# Patient Record
Sex: FEMALE | Race: WHITE | NOT HISPANIC OR LATINO | URBAN - METROPOLITAN AREA
[De-identification: names, ages, dates, MRNs, and addresses within clinical notes are randomized per-mention and may not be internally consistent; named-entity substitution may affect disease eponyms.]

---

## 2019-04-08 ENCOUNTER — INPATIENT (INPATIENT)
Facility: HOSPITAL | Age: 74
LOS: 17 days | Discharge: HOME | End: 2019-04-26
Attending: PSYCHIATRY & NEUROLOGY | Admitting: PSYCHIATRY & NEUROLOGY
Payer: MEDICARE

## 2019-04-08 VITALS — HEIGHT: 64 IN | TEMPERATURE: 97 F | RESPIRATION RATE: 17 BRPM | WEIGHT: 182.1 LBS

## 2019-04-08 DIAGNOSIS — F32.9 MAJOR DEPRESSIVE DISORDER, SINGLE EPISODE, UNSPECIFIED: ICD-10-CM

## 2019-04-08 RX ORDER — HYDROXYZINE HCL 10 MG
25 TABLET ORAL EVERY 8 HOURS
Qty: 0 | Refills: 0 | Status: DISCONTINUED | OUTPATIENT
Start: 2019-04-08 | End: 2019-04-26

## 2019-04-08 RX ORDER — ATORVASTATIN CALCIUM 80 MG/1
10 TABLET, FILM COATED ORAL
Qty: 0 | Refills: 0 | Status: DISCONTINUED | OUTPATIENT
Start: 2019-04-08 | End: 2019-04-10

## 2019-04-08 RX ORDER — LANOLIN ALCOHOL/MO/W.PET/CERES
3 CREAM (GRAM) TOPICAL AT BEDTIME
Qty: 0 | Refills: 0 | Status: DISCONTINUED | OUTPATIENT
Start: 2019-04-08 | End: 2019-04-26

## 2019-04-08 RX ORDER — CITALOPRAM 10 MG/1
60 TABLET, FILM COATED ORAL
Qty: 0 | Refills: 0 | Status: DISCONTINUED | OUTPATIENT
Start: 2019-04-08 | End: 2019-04-10

## 2019-04-08 RX ORDER — CEPHALEXIN 500 MG
500 CAPSULE ORAL
Qty: 0 | Refills: 0 | Status: DISCONTINUED | OUTPATIENT
Start: 2019-04-08 | End: 2019-04-08

## 2019-04-08 RX ORDER — CEPHALEXIN 500 MG
500 CAPSULE ORAL
Qty: 0 | Refills: 0 | Status: DISCONTINUED | OUTPATIENT
Start: 2019-04-08 | End: 2019-04-09

## 2019-04-08 RX ORDER — LEVOTHYROXINE SODIUM 125 MCG
75 TABLET ORAL
Qty: 0 | Refills: 0 | Status: DISCONTINUED | OUTPATIENT
Start: 2019-04-08 | End: 2019-04-09

## 2019-04-08 RX ORDER — LITHIUM CARBONATE 300 MG/1
300 TABLET, EXTENDED RELEASE ORAL
Qty: 0 | Refills: 0 | Status: DISCONTINUED | OUTPATIENT
Start: 2019-04-08 | End: 2019-04-10

## 2019-04-08 RX ADMIN — Medication 500 MILLIGRAM(S): at 23:48

## 2019-04-08 RX ADMIN — Medication 3 MILLIGRAM(S): at 23:50

## 2019-04-08 NOTE — CHART NOTE - NSCHARTNOTEFT_GEN_A_CORE
Screening Medical Evaluation  Patient Admitted from: [facility] admitting diagnosis: Major depressive disorder with single episode    PAST MEDICAL & SURGICAL HISTORY:        Allergies    No Known Allergies    Intolerances        Social History:     FAMILY HISTORY:      MEDICATIONS  (STANDING):  atorvastatin 10 milliGRAM(s) Oral <User Schedule>  cephalexin 500 milliGRAM(s) Oral <User Schedule>  citalopram 60 milliGRAM(s) Oral <User Schedule>  levothyroxine 75 MICROGram(s) Oral <User Schedule>  lithium 300 milliGRAM(s) Oral <User Schedule>    MEDICATIONS  (PRN):  hydrOXYzine hydrochloride 25 milliGRAM(s) Oral every 8 hours PRN Anxiety  LORazepam     Tablet 0.5 milliGRAM(s) Oral every 6 hours PRN Agitation  LORazepam   Injectable 0.5 milliGRAM(s) IntraMuscular once PRN severe agitation      Vital Signs Last 24 Hrs  T(C): 36.3 (08 Apr 2019 21:45), Max: 36.3 (08 Apr 2019 21:45)  T(F): 97.4 (08 Apr 2019 21:45), Max: 97.4 (08 Apr 2019 21:45)  HR: --87  BP: --141/75  BP(mean): --  RR: 17 (08 Apr 2019 21:45) (17 - 17)  SpO2: --  CAPILLARY BLOOD GLUCOSE            PHYSICAL EXAM:  GENERAL: NAD, well-developed  HEAD:  Atraumatic, Normocephalic  EYES: EOMI, PERRLA, conjunctiva and sclera clear  NECK: Supple, No JVD  CHEST/LUNG: Clear to auscultation bilaterally; No wheeze  HEART: Regular rate and rhythm; No murmurs, rubs, or gallops  ABDOMEN: Soft, Nontender, Nondistended; Bowel sounds present  EXTREMITIES:  2+ Peripheral Pulses, No clubbing, cyanosis, or edema  PSYCH: AAOx3  NEUROLOGY: non-focal  SKIN: Self inflicted wounds noted on B/L wrist requiring sutures    LABS:                    RADIOLOGY & ADDITIONAL TESTS:    Assessment and Plan: 74 yo F with PMH of hypothyroidism and hyperlipidemia along with multiple self inflicted wounds to B/L wrist is admitted to [facility] with a primary psychiatric diagnosis of Major depressive disorder with single episode. The pt currently denies having any medical complaints such as chest pain, sob, abdominal pain, n/v/d/c, or any problems with urination or bowel movements. The rest of her screening physical is unremarkable.    1.Major depressive disorder with single episode-Plan: continue with meds as per primary psychiatric team  2. Hypothyroidism-Plan: continue with levothyroxine  3. Hyperlipidemia-Plan: continue with atorvastatin  4. B/L wrist wound-Plan: continue with cephalexin; continue to monitor for any signs of infection such as fever, chills, redness, or oozing

## 2019-04-09 LAB
BASOPHILS # BLD AUTO: 0.07 K/UL — SIGNIFICANT CHANGE UP (ref 0–0.2)
BASOPHILS NFR BLD AUTO: 0.5 % — SIGNIFICANT CHANGE UP (ref 0–2)
EOSINOPHIL # BLD AUTO: 0.24 K/UL — SIGNIFICANT CHANGE UP (ref 0–0.5)
EOSINOPHIL NFR BLD AUTO: 1.6 % — SIGNIFICANT CHANGE UP (ref 0–6)
HCT VFR BLD CALC: 38.5 % — SIGNIFICANT CHANGE UP (ref 34.5–45)
HGB BLD-MCNC: 12 G/DL — SIGNIFICANT CHANGE UP (ref 11.5–15.5)
IMM GRANULOCYTES NFR BLD AUTO: 0.5 % — SIGNIFICANT CHANGE UP (ref 0–1.5)
LYMPHOCYTES # BLD AUTO: 13.6 % — SIGNIFICANT CHANGE UP (ref 13–44)
LYMPHOCYTES # BLD AUTO: 2.09 K/UL — SIGNIFICANT CHANGE UP (ref 1–3.3)
MCHC RBC-ENTMCNC: 29.3 PG — SIGNIFICANT CHANGE UP (ref 27–34)
MCHC RBC-ENTMCNC: 31.2 % — LOW (ref 32–36)
MCV RBC AUTO: 93.9 FL — SIGNIFICANT CHANGE UP (ref 80–100)
MONOCYTES # BLD AUTO: 0.67 K/UL — SIGNIFICANT CHANGE UP (ref 0–0.9)
MONOCYTES NFR BLD AUTO: 4.4 % — SIGNIFICANT CHANGE UP (ref 2–14)
NEUTROPHILS # BLD AUTO: 12.19 K/UL — HIGH (ref 1.8–7.4)
NEUTROPHILS NFR BLD AUTO: 79.4 % — HIGH (ref 43–77)
NRBC # FLD: 0 K/UL — SIGNIFICANT CHANGE UP (ref 0–0)
PLATELET # BLD AUTO: 430 K/UL — HIGH (ref 150–400)
PMV BLD: 9.5 FL — SIGNIFICANT CHANGE UP (ref 7–13)
RBC # BLD: 4.1 M/UL — SIGNIFICANT CHANGE UP (ref 3.8–5.2)
RBC # FLD: 14.6 % — HIGH (ref 10.3–14.5)
WBC # BLD: 15.34 K/UL — HIGH (ref 3.8–10.5)
WBC # FLD AUTO: 15.34 K/UL — HIGH (ref 3.8–10.5)

## 2019-04-09 PROCEDURE — 99222 1ST HOSP IP/OBS MODERATE 55: CPT

## 2019-04-09 RX ORDER — CEPHALEXIN 500 MG
500 CAPSULE ORAL
Qty: 0 | Refills: 0 | Status: DISCONTINUED | OUTPATIENT
Start: 2019-04-10 | End: 2019-04-10

## 2019-04-09 RX ORDER — LEVOTHYROXINE SODIUM 125 MCG
50 TABLET ORAL
Qty: 0 | Refills: 0 | Status: DISCONTINUED | OUTPATIENT
Start: 2019-04-09 | End: 2019-04-10

## 2019-04-09 RX ORDER — BACITRACIN ZINC 500 UNIT/G
1 OINTMENT IN PACKET (EA) TOPICAL
Qty: 0 | Refills: 0 | Status: DISCONTINUED | OUTPATIENT
Start: 2019-04-09 | End: 2019-04-26

## 2019-04-09 RX ORDER — CEPHALEXIN 500 MG
500 CAPSULE ORAL
Qty: 0 | Refills: 0 | Status: COMPLETED | OUTPATIENT
Start: 2019-04-09 | End: 2019-04-09

## 2019-04-09 RX ADMIN — CITALOPRAM 60 MILLIGRAM(S): 10 TABLET, FILM COATED ORAL at 20:45

## 2019-04-09 RX ADMIN — Medication 50 MICROGRAM(S): at 20:45

## 2019-04-09 RX ADMIN — Medication 0.5 MILLIGRAM(S): at 13:31

## 2019-04-09 RX ADMIN — LITHIUM CARBONATE 300 MILLIGRAM(S): 300 TABLET, EXTENDED RELEASE ORAL at 07:46

## 2019-04-09 RX ADMIN — Medication 1 APPLICATION(S): at 20:45

## 2019-04-09 RX ADMIN — Medication 0.5 MILLIGRAM(S): at 02:21

## 2019-04-09 RX ADMIN — Medication 500 MILLIGRAM(S): at 15:02

## 2019-04-09 RX ADMIN — Medication 500 MILLIGRAM(S): at 22:01

## 2019-04-09 RX ADMIN — LITHIUM CARBONATE 300 MILLIGRAM(S): 300 TABLET, EXTENDED RELEASE ORAL at 20:43

## 2019-04-09 RX ADMIN — ATORVASTATIN CALCIUM 10 MILLIGRAM(S): 80 TABLET, FILM COATED ORAL at 20:46

## 2019-04-09 RX ADMIN — Medication 500 MILLIGRAM(S): at 07:46

## 2019-04-09 RX ADMIN — Medication 3 MILLIGRAM(S): at 20:45

## 2019-04-09 RX ADMIN — Medication 1 APPLICATION(S): at 09:17

## 2019-04-09 NOTE — PHARMACOTHERAPY INTERVENTION NOTE - COMMENTS
Patient ordered for cephalexin 500 mg TID x7 days for prophylaxis of wound treatment. Contacted Dr Moore and recommended that dosing for cephalexin for uncomplicated skin infections is 250 mg orally every 6 hours  mg every 12 hours for 7 to 14 days. If suspecting MSSA, recommended dosing is 500 mg ORALLY 4 times daily. MD agreed to change cephalexin dose to BID for remainder of therapy.

## 2019-04-10 PROCEDURE — 99232 SBSQ HOSP IP/OBS MODERATE 35: CPT

## 2019-04-10 RX ORDER — CEPHALEXIN 500 MG
500 CAPSULE ORAL
Qty: 0 | Refills: 0 | Status: COMPLETED | OUTPATIENT
Start: 2019-04-10 | End: 2019-04-15

## 2019-04-10 RX ORDER — LEVOTHYROXINE SODIUM 125 MCG
50 TABLET ORAL ONCE
Qty: 0 | Refills: 0 | Status: COMPLETED | OUTPATIENT
Start: 2019-04-10 | End: 2019-04-10

## 2019-04-10 RX ORDER — LEVOTHYROXINE SODIUM 125 MCG
50 TABLET ORAL
Qty: 0 | Refills: 0 | Status: DISCONTINUED | OUTPATIENT
Start: 2019-04-10 | End: 2019-04-26

## 2019-04-10 RX ORDER — ESCITALOPRAM OXALATE 10 MG/1
10 TABLET, FILM COATED ORAL DAILY
Qty: 0 | Refills: 0 | Status: DISCONTINUED | OUTPATIENT
Start: 2019-04-10 | End: 2019-04-26

## 2019-04-10 RX ORDER — LANOLIN ALCOHOL/MO/W.PET/CERES
3 CREAM (GRAM) TOPICAL ONCE
Qty: 0 | Refills: 0 | Status: COMPLETED | OUTPATIENT
Start: 2019-04-10 | End: 2019-04-10

## 2019-04-10 RX ORDER — LITHIUM CARBONATE 300 MG/1
300 TABLET, EXTENDED RELEASE ORAL
Qty: 0 | Refills: 0 | Status: DISCONTINUED | OUTPATIENT
Start: 2019-04-10 | End: 2019-04-12

## 2019-04-10 RX ORDER — LITHIUM CARBONATE 300 MG/1
300 TABLET, EXTENDED RELEASE ORAL
Qty: 0 | Refills: 0 | Status: DISCONTINUED | OUTPATIENT
Start: 2019-04-10 | End: 2019-04-10

## 2019-04-10 RX ORDER — ATORVASTATIN CALCIUM 80 MG/1
10 TABLET, FILM COATED ORAL
Qty: 0 | Refills: 0 | Status: DISCONTINUED | OUTPATIENT
Start: 2019-04-10 | End: 2019-04-26

## 2019-04-10 RX ADMIN — Medication 25 MILLIGRAM(S): at 02:42

## 2019-04-10 RX ADMIN — Medication 25 MILLIGRAM(S): at 13:15

## 2019-04-10 RX ADMIN — Medication 50 MICROGRAM(S): at 13:16

## 2019-04-10 RX ADMIN — Medication 0.5 MILLIGRAM(S): at 21:32

## 2019-04-10 RX ADMIN — Medication 1 APPLICATION(S): at 09:59

## 2019-04-10 RX ADMIN — Medication 3 MILLIGRAM(S): at 02:42

## 2019-04-10 RX ADMIN — ATORVASTATIN CALCIUM 10 MILLIGRAM(S): 80 TABLET, FILM COATED ORAL at 20:49

## 2019-04-10 RX ADMIN — ESCITALOPRAM OXALATE 10 MILLIGRAM(S): 10 TABLET, FILM COATED ORAL at 13:16

## 2019-04-10 RX ADMIN — Medication 500 MILLIGRAM(S): at 20:49

## 2019-04-10 RX ADMIN — Medication 500 MILLIGRAM(S): at 10:05

## 2019-04-10 RX ADMIN — LITHIUM CARBONATE 300 MILLIGRAM(S): 300 TABLET, EXTENDED RELEASE ORAL at 20:49

## 2019-04-10 RX ADMIN — Medication 1 APPLICATION(S): at 20:50

## 2019-04-11 PROCEDURE — 99232 SBSQ HOSP IP/OBS MODERATE 35: CPT

## 2019-04-11 RX ORDER — CLONAZEPAM 1 MG
0.5 TABLET ORAL
Qty: 0 | Refills: 0 | Status: DISCONTINUED | OUTPATIENT
Start: 2019-04-11 | End: 2019-04-18

## 2019-04-11 RX ADMIN — Medication 3 MILLIGRAM(S): at 00:47

## 2019-04-11 RX ADMIN — ATORVASTATIN CALCIUM 10 MILLIGRAM(S): 80 TABLET, FILM COATED ORAL at 19:58

## 2019-04-11 RX ADMIN — Medication 25 MILLIGRAM(S): at 14:49

## 2019-04-11 RX ADMIN — ESCITALOPRAM OXALATE 10 MILLIGRAM(S): 10 TABLET, FILM COATED ORAL at 08:34

## 2019-04-11 RX ADMIN — LITHIUM CARBONATE 300 MILLIGRAM(S): 300 TABLET, EXTENDED RELEASE ORAL at 08:34

## 2019-04-11 RX ADMIN — Medication 3 MILLIGRAM(S): at 22:46

## 2019-04-11 RX ADMIN — Medication 500 MILLIGRAM(S): at 08:34

## 2019-04-11 RX ADMIN — Medication 1 APPLICATION(S): at 20:00

## 2019-04-11 RX ADMIN — Medication 5 MILLIGRAM(S): at 04:08

## 2019-04-11 RX ADMIN — Medication 0.5 MILLIGRAM(S): at 10:47

## 2019-04-11 RX ADMIN — Medication 50 MICROGRAM(S): at 06:15

## 2019-04-11 RX ADMIN — Medication 0.5 MILLIGRAM(S): at 20:00

## 2019-04-11 RX ADMIN — Medication 500 MILLIGRAM(S): at 19:58

## 2019-04-11 RX ADMIN — Medication 1 APPLICATION(S): at 08:31

## 2019-04-11 RX ADMIN — LITHIUM CARBONATE 300 MILLIGRAM(S): 300 TABLET, EXTENDED RELEASE ORAL at 19:59

## 2019-04-11 RX ADMIN — Medication 0.5 MILLIGRAM(S): at 23:25

## 2019-04-12 PROCEDURE — 99232 SBSQ HOSP IP/OBS MODERATE 35: CPT

## 2019-04-12 RX ORDER — LITHIUM CARBONATE 300 MG/1
150 TABLET, EXTENDED RELEASE ORAL DAILY
Qty: 0 | Refills: 0 | Status: DISCONTINUED | OUTPATIENT
Start: 2019-04-13 | End: 2019-04-26

## 2019-04-12 RX ORDER — LITHIUM CARBONATE 300 MG/1
300 TABLET, EXTENDED RELEASE ORAL
Qty: 0 | Refills: 0 | Status: DISCONTINUED | OUTPATIENT
Start: 2019-04-12 | End: 2019-04-26

## 2019-04-12 RX ORDER — QUETIAPINE FUMARATE 200 MG/1
25 TABLET, FILM COATED ORAL EVERY 6 HOURS
Qty: 0 | Refills: 0 | Status: DISCONTINUED | OUTPATIENT
Start: 2019-04-12 | End: 2019-04-26

## 2019-04-12 RX ADMIN — Medication 50 MICROGRAM(S): at 06:36

## 2019-04-12 RX ADMIN — ATORVASTATIN CALCIUM 10 MILLIGRAM(S): 80 TABLET, FILM COATED ORAL at 20:22

## 2019-04-12 RX ADMIN — Medication 1 APPLICATION(S): at 09:20

## 2019-04-12 RX ADMIN — ESCITALOPRAM OXALATE 10 MILLIGRAM(S): 10 TABLET, FILM COATED ORAL at 09:20

## 2019-04-12 RX ADMIN — Medication 1 APPLICATION(S): at 20:22

## 2019-04-12 RX ADMIN — Medication 500 MILLIGRAM(S): at 09:20

## 2019-04-12 RX ADMIN — Medication 0.5 MILLIGRAM(S): at 09:20

## 2019-04-12 RX ADMIN — Medication 500 MILLIGRAM(S): at 20:22

## 2019-04-12 RX ADMIN — Medication 0.5 MILLIGRAM(S): at 20:22

## 2019-04-12 RX ADMIN — Medication 3 MILLIGRAM(S): at 20:28

## 2019-04-12 RX ADMIN — LITHIUM CARBONATE 300 MILLIGRAM(S): 300 TABLET, EXTENDED RELEASE ORAL at 09:17

## 2019-04-12 RX ADMIN — LITHIUM CARBONATE 300 MILLIGRAM(S): 300 TABLET, EXTENDED RELEASE ORAL at 20:22

## 2019-04-13 RX ORDER — LANOLIN ALCOHOL/MO/W.PET/CERES
3 CREAM (GRAM) TOPICAL ONCE
Qty: 0 | Refills: 0 | Status: COMPLETED | OUTPATIENT
Start: 2019-04-13 | End: 2019-04-13

## 2019-04-13 RX ORDER — NYSTATIN CREAM 100000 [USP'U]/G
1 CREAM TOPICAL
Qty: 0 | Refills: 0 | Status: DISCONTINUED | OUTPATIENT
Start: 2019-04-13 | End: 2019-04-26

## 2019-04-13 RX ADMIN — Medication 500 MILLIGRAM(S): at 09:33

## 2019-04-13 RX ADMIN — Medication 1 APPLICATION(S): at 21:23

## 2019-04-13 RX ADMIN — Medication 1 APPLICATION(S): at 09:33

## 2019-04-13 RX ADMIN — LITHIUM CARBONATE 150 MILLIGRAM(S): 300 TABLET, EXTENDED RELEASE ORAL at 09:32

## 2019-04-13 RX ADMIN — ATORVASTATIN CALCIUM 10 MILLIGRAM(S): 80 TABLET, FILM COATED ORAL at 21:08

## 2019-04-13 RX ADMIN — Medication 3 MILLIGRAM(S): at 02:24

## 2019-04-13 RX ADMIN — Medication 50 MICROGRAM(S): at 06:11

## 2019-04-13 RX ADMIN — ESCITALOPRAM OXALATE 10 MILLIGRAM(S): 10 TABLET, FILM COATED ORAL at 09:32

## 2019-04-13 RX ADMIN — Medication 0.5 MILLIGRAM(S): at 09:33

## 2019-04-13 RX ADMIN — Medication 25 MILLIGRAM(S): at 11:03

## 2019-04-13 RX ADMIN — Medication 500 MILLIGRAM(S): at 21:09

## 2019-04-13 RX ADMIN — LITHIUM CARBONATE 300 MILLIGRAM(S): 300 TABLET, EXTENDED RELEASE ORAL at 21:09

## 2019-04-13 RX ADMIN — Medication 0.5 MILLIGRAM(S): at 21:09

## 2019-04-14 PROCEDURE — 99232 SBSQ HOSP IP/OBS MODERATE 35: CPT

## 2019-04-14 RX ADMIN — LITHIUM CARBONATE 150 MILLIGRAM(S): 300 TABLET, EXTENDED RELEASE ORAL at 09:03

## 2019-04-14 RX ADMIN — Medication 0.5 MILLIGRAM(S): at 20:36

## 2019-04-14 RX ADMIN — ATORVASTATIN CALCIUM 10 MILLIGRAM(S): 80 TABLET, FILM COATED ORAL at 20:37

## 2019-04-14 RX ADMIN — Medication 500 MILLIGRAM(S): at 09:03

## 2019-04-14 RX ADMIN — Medication 25 MILLIGRAM(S): at 01:17

## 2019-04-14 RX ADMIN — ESCITALOPRAM OXALATE 10 MILLIGRAM(S): 10 TABLET, FILM COATED ORAL at 09:04

## 2019-04-14 RX ADMIN — Medication 3 MILLIGRAM(S): at 00:40

## 2019-04-14 RX ADMIN — NYSTATIN CREAM 1 APPLICATION(S): 100000 CREAM TOPICAL at 09:03

## 2019-04-14 RX ADMIN — Medication 0.5 MILLIGRAM(S): at 09:03

## 2019-04-14 RX ADMIN — Medication 500 MILLIGRAM(S): at 20:36

## 2019-04-14 RX ADMIN — Medication 50 MICROGRAM(S): at 06:21

## 2019-04-14 RX ADMIN — Medication 1 APPLICATION(S): at 09:03

## 2019-04-14 RX ADMIN — Medication 1 APPLICATION(S): at 20:36

## 2019-04-14 RX ADMIN — LITHIUM CARBONATE 300 MILLIGRAM(S): 300 TABLET, EXTENDED RELEASE ORAL at 20:36

## 2019-04-15 LAB — LITHIUM SERPL-MCNC: 0.92 MMOL/L — SIGNIFICANT CHANGE UP (ref 0.6–1.2)

## 2019-04-15 PROCEDURE — 99232 SBSQ HOSP IP/OBS MODERATE 35: CPT

## 2019-04-15 RX ADMIN — NYSTATIN CREAM 1 APPLICATION(S): 100000 CREAM TOPICAL at 10:21

## 2019-04-15 RX ADMIN — Medication 1 APPLICATION(S): at 10:20

## 2019-04-15 RX ADMIN — Medication 50 MICROGRAM(S): at 05:30

## 2019-04-15 RX ADMIN — Medication 3 MILLIGRAM(S): at 01:22

## 2019-04-15 RX ADMIN — ATORVASTATIN CALCIUM 10 MILLIGRAM(S): 80 TABLET, FILM COATED ORAL at 20:19

## 2019-04-15 RX ADMIN — Medication 0.5 MILLIGRAM(S): at 09:24

## 2019-04-15 RX ADMIN — Medication 500 MILLIGRAM(S): at 09:24

## 2019-04-15 RX ADMIN — LITHIUM CARBONATE 150 MILLIGRAM(S): 300 TABLET, EXTENDED RELEASE ORAL at 09:24

## 2019-04-15 RX ADMIN — LITHIUM CARBONATE 300 MILLIGRAM(S): 300 TABLET, EXTENDED RELEASE ORAL at 20:19

## 2019-04-15 RX ADMIN — Medication 0.5 MILLIGRAM(S): at 20:19

## 2019-04-15 RX ADMIN — Medication 1 APPLICATION(S): at 21:03

## 2019-04-15 RX ADMIN — ESCITALOPRAM OXALATE 10 MILLIGRAM(S): 10 TABLET, FILM COATED ORAL at 09:24

## 2019-04-16 PROCEDURE — 99232 SBSQ HOSP IP/OBS MODERATE 35: CPT

## 2019-04-16 RX ORDER — QUETIAPINE FUMARATE 200 MG/1
25 TABLET, FILM COATED ORAL AT BEDTIME
Qty: 0 | Refills: 0 | Status: DISCONTINUED | OUTPATIENT
Start: 2019-04-16 | End: 2019-04-17

## 2019-04-16 RX ADMIN — ESCITALOPRAM OXALATE 10 MILLIGRAM(S): 10 TABLET, FILM COATED ORAL at 08:23

## 2019-04-16 RX ADMIN — Medication 0.5 MILLIGRAM(S): at 20:12

## 2019-04-16 RX ADMIN — Medication 3 MILLIGRAM(S): at 00:00

## 2019-04-16 RX ADMIN — Medication 25 MILLIGRAM(S): at 16:00

## 2019-04-16 RX ADMIN — Medication 25 MILLIGRAM(S): at 02:25

## 2019-04-16 RX ADMIN — Medication 1 APPLICATION(S): at 20:12

## 2019-04-16 RX ADMIN — LITHIUM CARBONATE 300 MILLIGRAM(S): 300 TABLET, EXTENDED RELEASE ORAL at 20:11

## 2019-04-16 RX ADMIN — NYSTATIN CREAM 1 APPLICATION(S): 100000 CREAM TOPICAL at 09:57

## 2019-04-16 RX ADMIN — LITHIUM CARBONATE 150 MILLIGRAM(S): 300 TABLET, EXTENDED RELEASE ORAL at 08:23

## 2019-04-16 RX ADMIN — QUETIAPINE FUMARATE 25 MILLIGRAM(S): 200 TABLET, FILM COATED ORAL at 20:11

## 2019-04-16 RX ADMIN — Medication 50 MICROGRAM(S): at 06:09

## 2019-04-16 RX ADMIN — ATORVASTATIN CALCIUM 10 MILLIGRAM(S): 80 TABLET, FILM COATED ORAL at 20:12

## 2019-04-16 RX ADMIN — Medication 1 APPLICATION(S): at 09:57

## 2019-04-16 RX ADMIN — Medication 0.5 MILLIGRAM(S): at 08:23

## 2019-04-17 PROCEDURE — 99232 SBSQ HOSP IP/OBS MODERATE 35: CPT

## 2019-04-17 RX ORDER — QUETIAPINE FUMARATE 200 MG/1
50 TABLET, FILM COATED ORAL AT BEDTIME
Qty: 0 | Refills: 0 | Status: DISCONTINUED | OUTPATIENT
Start: 2019-04-17 | End: 2019-04-24

## 2019-04-17 RX ADMIN — ATORVASTATIN CALCIUM 10 MILLIGRAM(S): 80 TABLET, FILM COATED ORAL at 20:46

## 2019-04-17 RX ADMIN — LITHIUM CARBONATE 150 MILLIGRAM(S): 300 TABLET, EXTENDED RELEASE ORAL at 09:13

## 2019-04-17 RX ADMIN — NYSTATIN CREAM 1 APPLICATION(S): 100000 CREAM TOPICAL at 09:13

## 2019-04-17 RX ADMIN — Medication 0.5 MILLIGRAM(S): at 20:46

## 2019-04-17 RX ADMIN — Medication 1 APPLICATION(S): at 20:48

## 2019-04-17 RX ADMIN — QUETIAPINE FUMARATE 50 MILLIGRAM(S): 200 TABLET, FILM COATED ORAL at 20:46

## 2019-04-17 RX ADMIN — Medication 1 APPLICATION(S): at 09:13

## 2019-04-17 RX ADMIN — Medication 50 MICROGRAM(S): at 06:07

## 2019-04-17 RX ADMIN — LITHIUM CARBONATE 300 MILLIGRAM(S): 300 TABLET, EXTENDED RELEASE ORAL at 20:46

## 2019-04-17 RX ADMIN — ESCITALOPRAM OXALATE 10 MILLIGRAM(S): 10 TABLET, FILM COATED ORAL at 09:13

## 2019-04-17 RX ADMIN — Medication 0.5 MILLIGRAM(S): at 09:13

## 2019-04-17 NOTE — CHART NOTE - NSCHARTNOTEFT_GEN_A_CORE
Pt seen and examined. Pt with 8 sutures on R wrist and 26 sutures on L wrist. As per patient, ED told her 14days until sutures can be removed (4/21 = 14th day). Mild erythema around sutures. No signs of infection, pus, scabbing, drainage. Removed 3 sutures from R-wrist and noted that skin although approximated, still needs time to heal. New suture count: R wrist- 4 sutures and L wrist 26 sutures. Please remove on 4/21/19. Pt to continue using Bacitracin ointment until then. Thank you.

## 2019-04-18 PROCEDURE — 99232 SBSQ HOSP IP/OBS MODERATE 35: CPT

## 2019-04-18 RX ORDER — CLONAZEPAM 1 MG
0.5 TABLET ORAL
Qty: 0 | Refills: 0 | Status: DISCONTINUED | OUTPATIENT
Start: 2019-04-18 | End: 2019-04-19

## 2019-04-18 RX ORDER — BENZOCAINE AND MENTHOL 5; 1 G/100ML; G/100ML
1 LIQUID ORAL EVERY 6 HOURS
Qty: 0 | Refills: 0 | Status: DISCONTINUED | OUTPATIENT
Start: 2019-04-18 | End: 2019-04-26

## 2019-04-18 RX ADMIN — Medication 1 APPLICATION(S): at 21:30

## 2019-04-18 RX ADMIN — LITHIUM CARBONATE 150 MILLIGRAM(S): 300 TABLET, EXTENDED RELEASE ORAL at 09:18

## 2019-04-18 RX ADMIN — Medication 0.5 MILLIGRAM(S): at 09:18

## 2019-04-18 RX ADMIN — ESCITALOPRAM OXALATE 10 MILLIGRAM(S): 10 TABLET, FILM COATED ORAL at 09:18

## 2019-04-18 RX ADMIN — ATORVASTATIN CALCIUM 10 MILLIGRAM(S): 80 TABLET, FILM COATED ORAL at 20:22

## 2019-04-18 RX ADMIN — Medication 1 APPLICATION(S): at 09:18

## 2019-04-18 RX ADMIN — NYSTATIN CREAM 1 APPLICATION(S): 100000 CREAM TOPICAL at 08:10

## 2019-04-18 RX ADMIN — Medication 50 MICROGRAM(S): at 06:51

## 2019-04-18 RX ADMIN — QUETIAPINE FUMARATE 50 MILLIGRAM(S): 200 TABLET, FILM COATED ORAL at 20:22

## 2019-04-18 RX ADMIN — Medication 0.5 MILLIGRAM(S): at 20:16

## 2019-04-18 RX ADMIN — LITHIUM CARBONATE 300 MILLIGRAM(S): 300 TABLET, EXTENDED RELEASE ORAL at 20:22

## 2019-04-19 PROCEDURE — 99232 SBSQ HOSP IP/OBS MODERATE 35: CPT

## 2019-04-19 RX ORDER — CLONAZEPAM 1 MG
0.5 TABLET ORAL
Qty: 0 | Refills: 0 | Status: DISCONTINUED | OUTPATIENT
Start: 2019-04-19 | End: 2019-04-26

## 2019-04-19 RX ADMIN — NYSTATIN CREAM 1 APPLICATION(S): 100000 CREAM TOPICAL at 08:47

## 2019-04-19 RX ADMIN — Medication 1 APPLICATION(S): at 09:02

## 2019-04-19 RX ADMIN — BENZOCAINE AND MENTHOL 1 LOZENGE: 5; 1 LIQUID ORAL at 10:28

## 2019-04-19 RX ADMIN — ATORVASTATIN CALCIUM 10 MILLIGRAM(S): 80 TABLET, FILM COATED ORAL at 20:41

## 2019-04-19 RX ADMIN — Medication 1 APPLICATION(S): at 21:27

## 2019-04-19 RX ADMIN — Medication 50 MICROGRAM(S): at 06:24

## 2019-04-19 RX ADMIN — LITHIUM CARBONATE 150 MILLIGRAM(S): 300 TABLET, EXTENDED RELEASE ORAL at 08:47

## 2019-04-19 RX ADMIN — ESCITALOPRAM OXALATE 10 MILLIGRAM(S): 10 TABLET, FILM COATED ORAL at 08:47

## 2019-04-19 RX ADMIN — LITHIUM CARBONATE 300 MILLIGRAM(S): 300 TABLET, EXTENDED RELEASE ORAL at 20:41

## 2019-04-19 RX ADMIN — Medication 25 MILLIGRAM(S): at 03:47

## 2019-04-19 RX ADMIN — QUETIAPINE FUMARATE 50 MILLIGRAM(S): 200 TABLET, FILM COATED ORAL at 20:41

## 2019-04-19 RX ADMIN — Medication 0.5 MILLIGRAM(S): at 20:41

## 2019-04-19 RX ADMIN — Medication 0.5 MILLIGRAM(S): at 08:47

## 2019-04-20 RX ADMIN — QUETIAPINE FUMARATE 50 MILLIGRAM(S): 200 TABLET, FILM COATED ORAL at 20:27

## 2019-04-20 RX ADMIN — NYSTATIN CREAM 1 APPLICATION(S): 100000 CREAM TOPICAL at 10:36

## 2019-04-20 RX ADMIN — BENZOCAINE AND MENTHOL 1 LOZENGE: 5; 1 LIQUID ORAL at 15:45

## 2019-04-20 RX ADMIN — LITHIUM CARBONATE 300 MILLIGRAM(S): 300 TABLET, EXTENDED RELEASE ORAL at 20:28

## 2019-04-20 RX ADMIN — Medication 50 MICROGRAM(S): at 06:39

## 2019-04-20 RX ADMIN — Medication 0.5 MILLIGRAM(S): at 09:07

## 2019-04-20 RX ADMIN — ATORVASTATIN CALCIUM 10 MILLIGRAM(S): 80 TABLET, FILM COATED ORAL at 20:28

## 2019-04-20 RX ADMIN — Medication 1 APPLICATION(S): at 09:06

## 2019-04-20 RX ADMIN — Medication 0.5 MILLIGRAM(S): at 20:11

## 2019-04-20 RX ADMIN — ESCITALOPRAM OXALATE 10 MILLIGRAM(S): 10 TABLET, FILM COATED ORAL at 09:07

## 2019-04-20 RX ADMIN — LITHIUM CARBONATE 150 MILLIGRAM(S): 300 TABLET, EXTENDED RELEASE ORAL at 09:07

## 2019-04-21 RX ORDER — ACETAMINOPHEN 500 MG
650 TABLET ORAL ONCE
Qty: 0 | Refills: 0 | Status: COMPLETED | OUTPATIENT
Start: 2019-04-21 | End: 2019-04-21

## 2019-04-21 RX ADMIN — QUETIAPINE FUMARATE 50 MILLIGRAM(S): 200 TABLET, FILM COATED ORAL at 20:05

## 2019-04-21 RX ADMIN — Medication 0.5 MILLIGRAM(S): at 08:44

## 2019-04-21 RX ADMIN — Medication 3 MILLIGRAM(S): at 00:19

## 2019-04-21 RX ADMIN — Medication 1 APPLICATION(S): at 08:44

## 2019-04-21 RX ADMIN — LITHIUM CARBONATE 300 MILLIGRAM(S): 300 TABLET, EXTENDED RELEASE ORAL at 20:05

## 2019-04-21 RX ADMIN — Medication 0.5 MILLIGRAM(S): at 20:05

## 2019-04-21 RX ADMIN — Medication 650 MILLIGRAM(S): at 11:04

## 2019-04-21 RX ADMIN — Medication 50 MICROGRAM(S): at 06:32

## 2019-04-21 RX ADMIN — ATORVASTATIN CALCIUM 10 MILLIGRAM(S): 80 TABLET, FILM COATED ORAL at 20:05

## 2019-04-21 RX ADMIN — ESCITALOPRAM OXALATE 10 MILLIGRAM(S): 10 TABLET, FILM COATED ORAL at 08:44

## 2019-04-21 RX ADMIN — Medication 650 MILLIGRAM(S): at 12:00

## 2019-04-21 RX ADMIN — LITHIUM CARBONATE 150 MILLIGRAM(S): 300 TABLET, EXTENDED RELEASE ORAL at 08:44

## 2019-04-21 RX ADMIN — NYSTATIN CREAM 1 APPLICATION(S): 100000 CREAM TOPICAL at 10:31

## 2019-04-21 NOTE — CHART NOTE - NSCHARTNOTEFT_GEN_A_CORE
Four sutures removed from Right wrist and 26 sutures removed from left wrist.  Patient tolerated suture removal well.  No erythema, drainage, or bleeding.  She remains in good spirits.

## 2019-04-22 PROCEDURE — 99232 SBSQ HOSP IP/OBS MODERATE 35: CPT

## 2019-04-22 RX ORDER — QUETIAPINE FUMARATE 200 MG/1
25 TABLET, FILM COATED ORAL
Qty: 0 | Refills: 0 | Status: DISCONTINUED | OUTPATIENT
Start: 2019-04-22 | End: 2019-04-26

## 2019-04-22 RX ORDER — ACETAMINOPHEN 500 MG
650 TABLET ORAL EVERY 6 HOURS
Qty: 0 | Refills: 0 | Status: DISCONTINUED | OUTPATIENT
Start: 2019-04-22 | End: 2019-04-26

## 2019-04-22 RX ADMIN — Medication 0.5 MILLIGRAM(S): at 07:59

## 2019-04-22 RX ADMIN — Medication 650 MILLIGRAM(S): at 11:40

## 2019-04-22 RX ADMIN — LITHIUM CARBONATE 300 MILLIGRAM(S): 300 TABLET, EXTENDED RELEASE ORAL at 19:57

## 2019-04-22 RX ADMIN — QUETIAPINE FUMARATE 50 MILLIGRAM(S): 200 TABLET, FILM COATED ORAL at 20:00

## 2019-04-22 RX ADMIN — Medication 650 MILLIGRAM(S): at 21:19

## 2019-04-22 RX ADMIN — Medication 50 MICROGRAM(S): at 06:32

## 2019-04-22 RX ADMIN — NYSTATIN CREAM 1 APPLICATION(S): 100000 CREAM TOPICAL at 07:59

## 2019-04-22 RX ADMIN — QUETIAPINE FUMARATE 25 MILLIGRAM(S): 200 TABLET, FILM COATED ORAL at 15:52

## 2019-04-22 RX ADMIN — LITHIUM CARBONATE 150 MILLIGRAM(S): 300 TABLET, EXTENDED RELEASE ORAL at 07:59

## 2019-04-22 RX ADMIN — ESCITALOPRAM OXALATE 10 MILLIGRAM(S): 10 TABLET, FILM COATED ORAL at 07:59

## 2019-04-22 RX ADMIN — Medication 0.5 MILLIGRAM(S): at 20:00

## 2019-04-22 RX ADMIN — Medication 650 MILLIGRAM(S): at 19:59

## 2019-04-22 RX ADMIN — Medication 1 APPLICATION(S): at 07:59

## 2019-04-22 RX ADMIN — Medication 650 MILLIGRAM(S): at 12:40

## 2019-04-22 RX ADMIN — ATORVASTATIN CALCIUM 10 MILLIGRAM(S): 80 TABLET, FILM COATED ORAL at 19:56

## 2019-04-23 PROCEDURE — 99232 SBSQ HOSP IP/OBS MODERATE 35: CPT

## 2019-04-23 RX ADMIN — Medication 650 MILLIGRAM(S): at 20:53

## 2019-04-23 RX ADMIN — NYSTATIN CREAM 1 APPLICATION(S): 100000 CREAM TOPICAL at 08:18

## 2019-04-23 RX ADMIN — LITHIUM CARBONATE 150 MILLIGRAM(S): 300 TABLET, EXTENDED RELEASE ORAL at 08:18

## 2019-04-23 RX ADMIN — QUETIAPINE FUMARATE 50 MILLIGRAM(S): 200 TABLET, FILM COATED ORAL at 20:38

## 2019-04-23 RX ADMIN — Medication 0.5 MILLIGRAM(S): at 20:38

## 2019-04-23 RX ADMIN — ATORVASTATIN CALCIUM 10 MILLIGRAM(S): 80 TABLET, FILM COATED ORAL at 20:38

## 2019-04-23 RX ADMIN — QUETIAPINE FUMARATE 25 MILLIGRAM(S): 200 TABLET, FILM COATED ORAL at 13:25

## 2019-04-23 RX ADMIN — Medication 0.5 MILLIGRAM(S): at 08:18

## 2019-04-23 RX ADMIN — LITHIUM CARBONATE 300 MILLIGRAM(S): 300 TABLET, EXTENDED RELEASE ORAL at 20:38

## 2019-04-23 RX ADMIN — Medication 650 MILLIGRAM(S): at 19:57

## 2019-04-23 RX ADMIN — Medication 1 APPLICATION(S): at 08:12

## 2019-04-23 RX ADMIN — Medication 650 MILLIGRAM(S): at 07:17

## 2019-04-23 RX ADMIN — Medication 50 MICROGRAM(S): at 06:48

## 2019-04-23 RX ADMIN — Medication 650 MILLIGRAM(S): at 08:29

## 2019-04-23 RX ADMIN — Medication 1 APPLICATION(S): at 20:39

## 2019-04-23 RX ADMIN — ESCITALOPRAM OXALATE 10 MILLIGRAM(S): 10 TABLET, FILM COATED ORAL at 08:18

## 2019-04-24 PROCEDURE — 99232 SBSQ HOSP IP/OBS MODERATE 35: CPT

## 2019-04-24 RX ORDER — QUETIAPINE FUMARATE 200 MG/1
50 TABLET, FILM COATED ORAL AT BEDTIME
Qty: 0 | Refills: 0 | Status: DISCONTINUED | OUTPATIENT
Start: 2019-04-24 | End: 2019-04-26

## 2019-04-24 RX ORDER — QUETIAPINE FUMARATE 200 MG/1
75 TABLET, FILM COATED ORAL AT BEDTIME
Qty: 0 | Refills: 0 | Status: DISCONTINUED | OUTPATIENT
Start: 2019-04-24 | End: 2019-04-24

## 2019-04-24 RX ADMIN — Medication 650 MILLIGRAM(S): at 20:21

## 2019-04-24 RX ADMIN — Medication 650 MILLIGRAM(S): at 14:52

## 2019-04-24 RX ADMIN — Medication 0.5 MILLIGRAM(S): at 07:44

## 2019-04-24 RX ADMIN — Medication 650 MILLIGRAM(S): at 08:35

## 2019-04-24 RX ADMIN — NYSTATIN CREAM 1 APPLICATION(S): 100000 CREAM TOPICAL at 07:44

## 2019-04-24 RX ADMIN — QUETIAPINE FUMARATE 50 MILLIGRAM(S): 200 TABLET, FILM COATED ORAL at 20:42

## 2019-04-24 RX ADMIN — QUETIAPINE FUMARATE 25 MILLIGRAM(S): 200 TABLET, FILM COATED ORAL at 15:02

## 2019-04-24 RX ADMIN — LITHIUM CARBONATE 300 MILLIGRAM(S): 300 TABLET, EXTENDED RELEASE ORAL at 20:42

## 2019-04-24 RX ADMIN — ESCITALOPRAM OXALATE 10 MILLIGRAM(S): 10 TABLET, FILM COATED ORAL at 07:44

## 2019-04-24 RX ADMIN — Medication 650 MILLIGRAM(S): at 07:35

## 2019-04-24 RX ADMIN — Medication 650 MILLIGRAM(S): at 21:00

## 2019-04-24 RX ADMIN — ATORVASTATIN CALCIUM 10 MILLIGRAM(S): 80 TABLET, FILM COATED ORAL at 20:42

## 2019-04-24 RX ADMIN — Medication 50 MICROGRAM(S): at 06:21

## 2019-04-24 RX ADMIN — Medication 650 MILLIGRAM(S): at 14:04

## 2019-04-24 RX ADMIN — Medication 0.5 MILLIGRAM(S): at 20:21

## 2019-04-24 RX ADMIN — LITHIUM CARBONATE 150 MILLIGRAM(S): 300 TABLET, EXTENDED RELEASE ORAL at 07:44

## 2019-04-24 RX ADMIN — Medication 1 APPLICATION(S): at 07:44

## 2019-04-25 LAB
ALBUMIN SERPL ELPH-MCNC: 3.8 G/DL — SIGNIFICANT CHANGE UP (ref 3.3–5)
ALP SERPL-CCNC: 65 U/L — SIGNIFICANT CHANGE UP (ref 40–120)
ALT FLD-CCNC: 10 U/L — SIGNIFICANT CHANGE UP (ref 4–33)
ANION GAP SERPL CALC-SCNC: 9 MMO/L — SIGNIFICANT CHANGE UP (ref 7–14)
AST SERPL-CCNC: 13 U/L — SIGNIFICANT CHANGE UP (ref 4–32)
BASOPHILS # BLD AUTO: 0.06 K/UL — SIGNIFICANT CHANGE UP (ref 0–0.2)
BASOPHILS NFR BLD AUTO: 0.7 % — SIGNIFICANT CHANGE UP (ref 0–2)
BILIRUB SERPL-MCNC: 0.4 MG/DL — SIGNIFICANT CHANGE UP (ref 0.2–1.2)
BUN SERPL-MCNC: 15 MG/DL — SIGNIFICANT CHANGE UP (ref 7–23)
CALCIUM SERPL-MCNC: 9.4 MG/DL — SIGNIFICANT CHANGE UP (ref 8.4–10.5)
CHLORIDE SERPL-SCNC: 106 MMOL/L — SIGNIFICANT CHANGE UP (ref 98–107)
CO2 SERPL-SCNC: 26 MMOL/L — SIGNIFICANT CHANGE UP (ref 22–31)
CREAT SERPL-MCNC: 1.08 MG/DL — SIGNIFICANT CHANGE UP (ref 0.5–1.3)
EOSINOPHIL # BLD AUTO: 0.31 K/UL — SIGNIFICANT CHANGE UP (ref 0–0.5)
EOSINOPHIL NFR BLD AUTO: 3.7 % — SIGNIFICANT CHANGE UP (ref 0–6)
GLUCOSE SERPL-MCNC: 85 MG/DL — SIGNIFICANT CHANGE UP (ref 70–99)
HCT VFR BLD CALC: 39.9 % — SIGNIFICANT CHANGE UP (ref 34.5–45)
HGB BLD-MCNC: 12.3 G/DL — SIGNIFICANT CHANGE UP (ref 11.5–15.5)
IMM GRANULOCYTES NFR BLD AUTO: 0.5 % — SIGNIFICANT CHANGE UP (ref 0–1.5)
LITHIUM SERPL-MCNC: 0.67 MMOL/L — SIGNIFICANT CHANGE UP (ref 0.6–1.2)
LYMPHOCYTES # BLD AUTO: 1.73 K/UL — SIGNIFICANT CHANGE UP (ref 1–3.3)
LYMPHOCYTES # BLD AUTO: 20.8 % — SIGNIFICANT CHANGE UP (ref 13–44)
MCHC RBC-ENTMCNC: 29.3 PG — SIGNIFICANT CHANGE UP (ref 27–34)
MCHC RBC-ENTMCNC: 30.8 % — LOW (ref 32–36)
MCV RBC AUTO: 95 FL — SIGNIFICANT CHANGE UP (ref 80–100)
MONOCYTES # BLD AUTO: 0.39 K/UL — SIGNIFICANT CHANGE UP (ref 0–0.9)
MONOCYTES NFR BLD AUTO: 4.7 % — SIGNIFICANT CHANGE UP (ref 2–14)
NEUTROPHILS # BLD AUTO: 5.79 K/UL — SIGNIFICANT CHANGE UP (ref 1.8–7.4)
NEUTROPHILS NFR BLD AUTO: 69.6 % — SIGNIFICANT CHANGE UP (ref 43–77)
NRBC # FLD: 0 K/UL — SIGNIFICANT CHANGE UP (ref 0–0)
PLATELET # BLD AUTO: 409 K/UL — HIGH (ref 150–400)
PMV BLD: 9.4 FL — SIGNIFICANT CHANGE UP (ref 7–13)
POTASSIUM SERPL-MCNC: 4.4 MMOL/L — SIGNIFICANT CHANGE UP (ref 3.5–5.3)
POTASSIUM SERPL-SCNC: 4.4 MMOL/L — SIGNIFICANT CHANGE UP (ref 3.5–5.3)
PROT SERPL-MCNC: 6.5 G/DL — SIGNIFICANT CHANGE UP (ref 6–8.3)
RBC # BLD: 4.2 M/UL — SIGNIFICANT CHANGE UP (ref 3.8–5.2)
RBC # FLD: 14.9 % — HIGH (ref 10.3–14.5)
SODIUM SERPL-SCNC: 141 MMOL/L — SIGNIFICANT CHANGE UP (ref 135–145)
WBC # BLD: 8.32 K/UL — SIGNIFICANT CHANGE UP (ref 3.8–10.5)
WBC # FLD AUTO: 8.32 K/UL — SIGNIFICANT CHANGE UP (ref 3.8–10.5)

## 2019-04-25 PROCEDURE — 99232 SBSQ HOSP IP/OBS MODERATE 35: CPT

## 2019-04-25 RX ORDER — LITHIUM CARBONATE 300 MG/1
1 TABLET, EXTENDED RELEASE ORAL
Qty: 15 | Refills: 0 | OUTPATIENT
Start: 2019-04-25 | End: 2019-05-09

## 2019-04-25 RX ORDER — LEVOTHYROXINE SODIUM 125 MCG
1 TABLET ORAL
Qty: 15 | Refills: 0 | OUTPATIENT
Start: 2019-04-25 | End: 2019-05-09

## 2019-04-25 RX ORDER — QUETIAPINE FUMARATE 200 MG/1
1 TABLET, FILM COATED ORAL
Qty: 15 | Refills: 0 | OUTPATIENT
Start: 2019-04-25 | End: 2019-05-09

## 2019-04-25 RX ORDER — ATORVASTATIN CALCIUM 80 MG/1
1 TABLET, FILM COATED ORAL
Qty: 15 | Refills: 0 | OUTPATIENT
Start: 2019-04-25 | End: 2019-05-09

## 2019-04-25 RX ORDER — NYSTATIN CREAM 100000 [USP'U]/G
1 CREAM TOPICAL
Qty: 1 | Refills: 0 | OUTPATIENT
Start: 2019-04-25 | End: 2019-05-09

## 2019-04-25 RX ORDER — ESCITALOPRAM OXALATE 10 MG/1
1 TABLET, FILM COATED ORAL
Qty: 15 | Refills: 0 | OUTPATIENT
Start: 2019-04-25 | End: 2019-05-09

## 2019-04-25 RX ORDER — CLONAZEPAM 1 MG
1 TABLET ORAL
Qty: 30 | Refills: 0 | OUTPATIENT
Start: 2019-04-25 | End: 2019-05-09

## 2019-04-25 RX ADMIN — Medication 650 MILLIGRAM(S): at 20:08

## 2019-04-25 RX ADMIN — ESCITALOPRAM OXALATE 10 MILLIGRAM(S): 10 TABLET, FILM COATED ORAL at 08:32

## 2019-04-25 RX ADMIN — Medication 1 APPLICATION(S): at 21:29

## 2019-04-25 RX ADMIN — ATORVASTATIN CALCIUM 10 MILLIGRAM(S): 80 TABLET, FILM COATED ORAL at 20:07

## 2019-04-25 RX ADMIN — QUETIAPINE FUMARATE 50 MILLIGRAM(S): 200 TABLET, FILM COATED ORAL at 20:08

## 2019-04-25 RX ADMIN — LITHIUM CARBONATE 300 MILLIGRAM(S): 300 TABLET, EXTENDED RELEASE ORAL at 20:08

## 2019-04-25 RX ADMIN — NYSTATIN CREAM 1 APPLICATION(S): 100000 CREAM TOPICAL at 08:32

## 2019-04-25 RX ADMIN — QUETIAPINE FUMARATE 25 MILLIGRAM(S): 200 TABLET, FILM COATED ORAL at 14:00

## 2019-04-25 RX ADMIN — Medication 0.5 MILLIGRAM(S): at 08:32

## 2019-04-25 RX ADMIN — LITHIUM CARBONATE 150 MILLIGRAM(S): 300 TABLET, EXTENDED RELEASE ORAL at 08:32

## 2019-04-25 RX ADMIN — Medication 1 APPLICATION(S): at 08:32

## 2019-04-25 RX ADMIN — Medication 0.5 MILLIGRAM(S): at 20:07

## 2019-04-25 RX ADMIN — Medication 50 MICROGRAM(S): at 06:16

## 2019-04-25 RX ADMIN — Medication 650 MILLIGRAM(S): at 21:00

## 2019-04-26 VITALS — TEMPERATURE: 98 F

## 2019-04-26 PROCEDURE — 99238 HOSP IP/OBS DSCHRG MGMT 30/<: CPT

## 2019-04-26 RX ADMIN — Medication 50 MICROGRAM(S): at 06:13

## 2019-04-26 RX ADMIN — LITHIUM CARBONATE 150 MILLIGRAM(S): 300 TABLET, EXTENDED RELEASE ORAL at 09:03

## 2019-04-26 RX ADMIN — ESCITALOPRAM OXALATE 10 MILLIGRAM(S): 10 TABLET, FILM COATED ORAL at 09:03

## 2019-04-26 RX ADMIN — Medication 1 APPLICATION(S): at 09:03

## 2019-04-26 RX ADMIN — Medication 0.5 MILLIGRAM(S): at 09:03

## 2019-04-26 RX ADMIN — NYSTATIN CREAM 1 APPLICATION(S): 100000 CREAM TOPICAL at 09:35
